# Patient Record
Sex: MALE | Race: ASIAN | Employment: FULL TIME | URBAN - METROPOLITAN AREA
[De-identification: names, ages, dates, MRNs, and addresses within clinical notes are randomized per-mention and may not be internally consistent; named-entity substitution may affect disease eponyms.]

---

## 2017-03-22 ENCOUNTER — OFFICE VISIT (OUTPATIENT)
Dept: INTERNAL MEDICINE | Facility: CLINIC | Age: 32
End: 2017-03-22

## 2017-03-22 VITALS
DIASTOLIC BLOOD PRESSURE: 82 MMHG | SYSTOLIC BLOOD PRESSURE: 134 MMHG | HEART RATE: 54 BPM | BODY MASS INDEX: 21.82 KG/M2 | WEIGHT: 131.1 LBS | OXYGEN SATURATION: 98 % | RESPIRATION RATE: 18 BRPM

## 2017-03-22 DIAGNOSIS — M35.9 CONNECTIVE TISSUE DISORDER (H): Primary | ICD-10-CM

## 2017-03-22 DIAGNOSIS — M35.9 CONNECTIVE TISSUE DISORDER (H): ICD-10-CM

## 2017-03-22 DIAGNOSIS — R74.8 ABNORMAL LIVER ENZYMES: ICD-10-CM

## 2017-03-22 LAB
ALBUMIN SERPL-MCNC: 4.4 G/DL (ref 3.4–5)
ALP SERPL-CCNC: 96 U/L (ref 40–150)
ALT SERPL W P-5'-P-CCNC: 64 U/L (ref 0–70)
ANION GAP SERPL CALCULATED.3IONS-SCNC: 7 MMOL/L (ref 3–14)
AST SERPL W P-5'-P-CCNC: 28 U/L (ref 0–45)
BILIRUB SERPL-MCNC: 0.6 MG/DL (ref 0.2–1.3)
BUN SERPL-MCNC: 17 MG/DL (ref 7–30)
CALCIUM SERPL-MCNC: 9.3 MG/DL (ref 8.5–10.1)
CHLORIDE SERPL-SCNC: 104 MMOL/L (ref 94–109)
CO2 SERPL-SCNC: 30 MMOL/L (ref 20–32)
CREAT SERPL-MCNC: 0.79 MG/DL (ref 0.66–1.25)
GFR SERPL CREATININE-BSD FRML MDRD: NORMAL ML/MIN/1.7M2
GLUCOSE SERPL-MCNC: 82 MG/DL (ref 70–99)
POTASSIUM SERPL-SCNC: 3.8 MMOL/L (ref 3.4–5.3)
PROT SERPL-MCNC: 8.6 G/DL (ref 6.8–8.8)
SODIUM SERPL-SCNC: 141 MMOL/L (ref 133–144)

## 2017-03-22 ASSESSMENT — PAIN SCALES - GENERAL: PAINLEVEL: NO PAIN (0)

## 2017-03-22 NOTE — MR AVS SNAPSHOT
After Visit Summary   3/22/2017    Mable Kent    MRN: [id number]           Patient Information     Date Of Birth          1985        Visit Information        Provider Department      3/22/2017 5:45 PM Iker Rushing MD [facility]        Today's Diagnoses     Connective tissue disorder (H)    -  1    Abnormal liver enzymes          Care Instructions    Primary Care Center Medication Refill Request Information:  * Please contact your pharmacy regarding ANY request for medication refills.  ** [facility] Prescription Fax = [phone]  * Please allow 3 business days for routine medication refills.  * Please allow 5 business days for controlled substance medication refills.     Primary Care Center Test Result notification information:  *You will be notified with in 7-10 days of your appointment day regarding the results of your test.  If you are on MyChart you will be notified as soon as the provider has reviewed the results and signed off on them.    Primary Care Center Phone Number [phone]          Follow-ups after your visit        Your next 10 appointments already scheduled     Mar 22, 2017  6:30 PM CDT   LAB with [facility] ([facility])    [address] [place] [id number] [phone]           Patient must bring picture ID.  Patient should be prepared to give a urine specimen  Please do not eat 10-12 hours before your appointment if you are coming in fasting for labs on lipids, cholesterol, or glucose (sugar).  Pregnant women should follow their Care Team instructions. Water with medications is okay. Do not drink coffee or other fluids.   If you have concerns about taking  your medications, please ask at office or if scheduling via [employer], send a message by clicking on Secure Messaging, Message Your Care Team.              Future tests that were ordered for you today     Open Standing Orders         Priority Remaining Interval Expires Ordered    Comprehensive metabolic panel Routine 4/4  3/22/2018 3/22/2017            Who to contact     Please call your clinic at [phone] to:    Ask questions about your health    Make or cancel appointments    Discuss your medicines    Learn about your test results    Speak to your doctor   If you have compliments or concerns about an experience at your clinic, or if you wish to file a complaint, please contact [facility] Patient Relations at [phone] or email us at [email or web address]         Additional Information About Your Visit        Lesson [employer] Information     [email or web address] gives you secure access to your electronic health record. If you see a primary care provider, you can also send messages to your care team and make appointments. If you have questions, please call your primary care clinic.  If you do not have a primary care provider, please call [phone] and they will assist you.      [email or web address] is an electronic gateway that provides easy, online access to your medical records. With [email or web address], you can request a clinic appointment, read your test results, renew a prescription or communicate with your care team.     To access your existing account, please contact your [facility] or call [phone] for assistance.        Care EveryWhere ID     This is your Care EveryWhere ID. This could be used by other organizations to access your [place] medical records  [id number]        Your Vitals Were     Pulse Respirations Pulse Oximetry BMI (Body Mass Index)          54 18 98% 21.82 kg/m2         Blood Pressure from Last 3 Encounters:   03/22/17 134/82   08/22/16 141/89   05/27/16 144/90    Weight from Last 3 Encounters:   03/22/17 59.5 kg (131 lb 1.6 oz)   05/27/16 59.9 kg (132 lb)   06/18/15 63.5 kg (140 lb)               Primary Care Provider Office Phone # Fax #    Iker Rushing [phone]  [phone]        [facility] [address]  [facility] [address]        Thank you!     Thank you for choosing [facility]  for your care. Our goal is always to provide you with excellent care. Hearing back from our patients is one way we can continue to improve our services. Please take a few minutes to complete the written survey that you may receive in the mail after your visit with us. Thank you!             Your Updated Medication List - Protect others around you: Learn how to safely use, store and throw away your medicines at www.disposemymeds.org.          This list is accurate as of: 3/22/17  5:49 PM.  Always use your most recent med list.                   Brand Name Dispense Instructions for use    cetirizine 10 MG tablet    zyrTEC    60 tablet    Take 1 tablet (10 mg) by mouth daily As needed for itching       finasteride 5 MG tablet    PROSCAR    30 tablet    Take 1 tablet (5 mg) by mouth daily       ibuprofen 800 MG tablet    ADVIL/MOTRIN    90 tablet    Take 1 tablet (800 mg) by mouth every 8 hours as needed for moderate pain       minoxidil 5 % Soln     120 mL    Apply topically twice daily.       Multi-vitamin Tabs tablet      Take 1 tablet by mouth daily

## 2017-03-22 NOTE — NURSING NOTE
Chief Complaint   Patient presents with     Medication Question     Patient has a medication question.      Felicia Zelaya LPN at 5:29 PM on 3/22/2017.

## 2017-03-22 NOTE — PATIENT INSTRUCTIONS
Primary Care Center Medication Refill Request Information:  * Please contact your pharmacy regarding ANY request for medication refills.  ** [facility] Prescription Fax = [phone]  * Please allow 3 business days for routine medication refills.  * Please allow 5 business days for controlled substance medication refills.     Primary [facility] Test Result notification information:  *You will be notified with in 7-10 days of your appointment day regarding the results of your test.  If you are on MyChart you will be notified as soon as the provider has reviewed the results and signed off on them.    Primary Care Center Phone Number [phone]

## 2017-03-22 NOTE — PROGRESS NOTES
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  It is a traditional Chinese medicine.  He has recently purchased it.  He has researched it online.  He has found that it can be associated with some problems with taking it with underlying liver or kidney disease.  He has had the previous elevated liver function studies.  He underwent extensive evaluation including liver biopsy for this, which was negative.  He is wanting to have this checked before he considers taking this.  Otherwise, he has been doing well, no problems on the finasteride.  Urinary symptoms have improved.      OBJECTIVE:  On examination, he does have male pattern baldness, particularly on the temporal areas.      ASSESSMENT:   1.  Male pattern baldness.   2.  Lower urinary tract symptoms, improved.      PLAN:  He will continue the finasteride.  We will check his CMP today.  Put a standing order for this if he wants to have it checked in the future.  If he develops any symptoms or problems, will have him follow up p.r.n. regarding this.     Iker Rushing

## 2017-07-27 ENCOUNTER — OFFICE VISIT (OUTPATIENT)
Dept: FAMILY MEDICINE | Facility: CLINIC | Age: 32
End: 2017-07-27

## 2017-07-27 VITALS
HEART RATE: 60 BPM | OXYGEN SATURATION: 98 % | SYSTOLIC BLOOD PRESSURE: 128 MMHG | RESPIRATION RATE: 20 BRPM | TEMPERATURE: 97.8 F | DIASTOLIC BLOOD PRESSURE: 80 MMHG | BODY MASS INDEX: 21.86 KG/M2 | WEIGHT: 136 LBS | HEIGHT: 66 IN

## 2017-07-27 DIAGNOSIS — M79.601 PAIN OF RIGHT UPPER EXTREMITY: Primary | ICD-10-CM

## 2017-07-27 NOTE — MR AVS SNAPSHOT
After Visit Summary   7/27/2017    Mable Kent    MRN: [id number]           Patient Information     Date Of Birth          1985        Visit Information        Provider Department      7/27/2017 9:40 AM Chiara Rodriguez MD [facility]        Today's Diagnoses     Pain of right upper extremity    -  1      Care Instructions    Here is the plan from today's visit    1. Pain of right upper extremity - OK to take tylenol or ibuprofen if it comes back and return if keeps being a problem.     Please call or return to clinic if your symptoms don't go away.    Follow up plan      Thank you for coming to [facility] today.  Lab Testing:  **If you had lab testing today and your results are reassuring or normal they will be mailed to you or sent through [employer] within 7 days.   **If the lab tests need quick action we will call you with the results.  The phone number we will call with results is # [phone] (home) . If this is not the best number please call our clinic and change the number.  Medication Refills:  If you need any refills please call your pharmacy and they will contact us.   If you need to  your refill at a new pharmacy, please contact the new pharmacy directly. The new pharmacy will help you get your medications transferred faster.   Scheduling:  If you have any concerns about today's visit or wish to schedule another appointment please call our office during normal business hours [phone] (8-5:00 M-F)  If a referral was made to a [facility] and you don't get a call from central scheduling please call [phone].  If a Mammogram was ordered for you at [facility] call [phone] to schedule or change your appointment.  If you had an XRay/CT/Ultrasound/MRI ordered the number is [phone] to schedule or change your radiology appointment.   Medical Concerns:  If you have urgent medical concerns please call [phone] at  "any time of the day.          Follow-ups after your visit        Who to contact     Please call your clinic at [phone] to:    Ask questions about your health    Make or cancel appointments    Discuss your medicines    Learn about your test results    Speak to your doctor   If you have compliments or concerns about an experience at your clinic, or if you wish to file a complaint, please contact [facility] Patient Relations at [phone] or email us at [email or web address]         Additional Information About Your Visit        [employer] Information     [employer] gives you secure access to your electronic health record. If you see a primary care provider, you can also send messages to your care team and make appointments. If you have questions, please call your primary care clinic.  If you do not have a primary care provider, please call [phone] and they will assist you.      [employer] is an electronic gateway that provides easy, online access to your medical records. With [employer], you can request a clinic appointment, read your test results, renew a prescription or communicate with your care team.     To access your existing account, please contact your [facility] or call [phone] for assistance.        Care EveryWhere ID     This is your Care EveryWhere ID. This could be used by other organizations to access your [place] medical records  [id number]        Your Vitals Were     Pulse Temperature Respirations Height Pulse Oximetry BMI (Body Mass Index)    60 97.8  F (36.6  C) (Oral) 20 5' 6.25\" (168.3 cm) 98% 21.79 kg/m2       Blood Pressure from Last 3 Encounters:   07/27/17 128/80   03/22/17 134/82   08/22/16 141/89    Weight from Last 3 Encounters:   07/27/17 136 lb (61.7 kg)   03/22/17 131 lb 1.6 oz (59.5 kg)   05/27/16 132 lb (59.9 kg)              Today, you had the following     No orders found for display       Primary Care " Provider Office Phone # Fax #    Iker Clinton Rushing [phone]        [facility] [address] [facility] [address]  [facility] [address]        Equal Access to Services     CEDRICK SIMON : Hadkaylynn oxana tatum mandyo Sojessicaali, waaxda luqadaha, qaybta kaalmada adekanda, evelyn kitchen laPoncejose cruz nayak. So [facility] [phone].    ATENCIÓN: Si habla español, tiene a sol disposición servicios gratuitos de asistencia lingüística. Llame al [phone].    We comply with applicable federal civil rights laws and [place] laws. We do not discriminate on the basis of race, color, national origin, age, disability sex, sexual orientation or gender identity.            Thank you!     Thank you for choosing [facility]  for your care. Our goal is always to provide you with excellent care. Hearing back from our patients is one way we can continue to improve our services. Please take a few minutes to complete the written survey that you may receive in the mail after your visit with us. Thank you!             Your Updated Medication List - Protect others around you: Learn how to safely use, store and throw away your medicines at www.disposemymeds.org.          This list is accurate as of: 7/27/17 11:03 AM.  Always use your most recent med list.                   Brand Name Dispense Instructions for use Diagnosis    cetirizine 10 MG tablet    zyrTEC    60 tablet    Take 1 tablet (10 mg) by mouth daily As needed for itching    Itching, Abnormal liver enzymes, Connective tissue disorder (H)       finasteride 5 MG tablet    PROSCAR    30 tablet    Take 1 tablet (5 mg) by mouth daily    Lower urinary tract symptoms (LUTS)       ibuprofen 800 MG tablet    ADVIL/MOTRIN    90 tablet    Take 1 tablet (800 mg) by mouth every 8 hours as needed for moderate pain    Connective tissue disorder (H)       minoxidil 5 % Soln     120 mL    Apply topically twice daily.    Alopecia, unspecified       Multi-vitamin  Tabs tablet      Take 1 tablet by mouth daily

## 2017-07-27 NOTE — PATIENT INSTRUCTIONS
Here is the plan from today's visit    1. Pain of right upper extremity - OK to take tylenol or ibuprofen if it comes back and return if keeps being a problem.     Please call or return to clinic if your symptoms don't go away.    Follow up plan      Thank you for coming to [facility] today.  Lab Testing:  **If you had lab testing today and your results are reassuring or normal they will be mailed to you or sent through [facility] within 7 days.   **If the lab tests need quick action we will call you with the results.  The phone number we will call with results is # [phone] (home) . If this is not the best number please call our clinic and change the number.  Medication Refills:  If you need any refills please call your pharmacy and they will contact us.   If you need to  your refill at a new pharmacy, please contact the new pharmacy directly. The new pharmacy will help you get your medications transferred faster.   Scheduling:  If you have any concerns about today's visit or wish to schedule another appointment please call our office during normal business hours [phone] (8-5:00 M-F)  If a referral was made to a [facility] and you don't get a call from central scheduling please call [phone].  If a Mammogram was ordered for you at [facility] call [phone] to schedule or change your appointment.  If you had an XRay/CT/Ultrasound/MRI ordered the number is [phone] to schedule or change your radiology appointment.   Medical Concerns:  If you have urgent medical concerns please call [phone] at any time of the day.

## 2017-07-27 NOTE — PROGRESS NOTES
"      HPI:       Mable Kent is a 32 year old who presents for the following  Patient presents with:  Musculoskeletal Problem: unable to sleep, right deltoid pulsing, beating, increasing    Previously was getting care at the [facility] arm has had \"pulsating\" pain in the triceps area musculature on and off since yesterday. No pattern of provocative factors. No obvious mechanism of injury. Insidious onset. Hasn't tried any therapies. No associated weakness, paresthesias. Symptoms are limited to the triceps area. Pulsating. No fever, chills, weight loss or other systemic symptoms. No recent illness. Notices that he has had most symptoms at rest. Can reproduce the throbbing sensation by palpation of the area. Some stress yesterday (interview).       Concern: Arm Irritation   Description of the problem :Pulsating arm, beating,deltoid     When did it start?: 1 day    Intensity: moderate    Progression of Symptoms:  worsening and intermittent    Therapies Tried nothing    What has worked?  n/a            Adherence and Exercise  Medication side effects: no  How often is a medication missed? Never  Are you able to follow the treatment plan? Yes  Exercise:running 2-3 days/week for an average of 15-30 minutes          Problem, Medication and Allergy Lists were reviewed and are current.  Patient is a new patient to this clinic and so  I reviewed/updated the Past Medical History, the Family History and the Social History.          Review of Systems:   Review of Systems          Physical Exam:     Patient Vitals for the past 24 hrs:   BP Temp Temp src Pulse Resp SpO2 Height Weight   07/27/17 0959 128/80 97.8  F (36.6  C) Oral 60 20 98 % 5' 6.25\" (168.3 cm) 136 lb (61.7 kg)     Body mass index is 21.79 kg/(m^2).  Vitals were reviewed and were normal     Physical Exam  Right arm with normal ROM, non tender on palpation of the upper arm (triceps, biceps and deltoid) - nl strength. Nl exam of hand with nl function " of ulnar, radial and median nerves. Nl sensation  Psych- nl affect, nl judgement and insight, good eye contact, well dressed.     Results:       Assessment and Plan     Mable was seen today for musculoskeletal problem.    Diagnoses and all orders for this visit:    Pain of right upper extremity - nl exam and symptoms resolved. Reviewed that if symptoms return or get worse to come back and we can do some imaging.        There are no discontinued medications.  Options for treatment and follow-up care were reviewed with the patient. Mable Kent  engaged in the decision making process and verbalized understanding of the options discussed and agreed with the final plan.    Chiara Rodriguez MD    The medical student acted as scribe and the encounter documented above was completely performed by myself and the documentation reflects the work I have performed today. Chiara Rodriguez MD

## 2017-10-05 DIAGNOSIS — R39.9 LOWER URINARY TRACT SYMPTOMS (LUTS): ICD-10-CM

## 2017-10-05 DIAGNOSIS — M35.9 CONNECTIVE TISSUE DISORDER (H): ICD-10-CM

## 2017-10-05 RX ORDER — FINASTERIDE 5 MG/1
5 TABLET, FILM COATED ORAL DAILY
Qty: 90 TABLET | Refills: 0 | Status: SHIPPED | OUTPATIENT
Start: 2017-10-05

## 2017-10-05 RX ORDER — IBUPROFEN 800 MG/1
800 TABLET, FILM COATED ORAL EVERY 8 HOURS PRN
Qty: 90 TABLET | Refills: 0 | Status: SHIPPED | OUTPATIENT
Start: 2017-10-05

## 2017-10-05 NOTE — TELEPHONE ENCOUNTER
proscar   Last Written Prescription Date:  8/22/16  Last Fill Quantity: 30,   # refills: 3  advil      Last Written Prescription Date:  4/5/16  Last Fill Quantity: 90,   # refills: 2  Last Office Visit : 3/22/17  Future Office visit:  No future appt  Advil routed to clinic RN dose not on protocol    BP Readings from Last 3 Encounters:   07/27/17 128/80   03/22/17 134/82   08/22/16 141/89

## 2020-03-02 ENCOUNTER — HEALTH MAINTENANCE LETTER (OUTPATIENT)
Age: 35
End: 2020-03-02

## 2020-12-14 ENCOUNTER — HEALTH MAINTENANCE LETTER (OUTPATIENT)
Age: 35
End: 2020-12-14

## 2021-04-18 ENCOUNTER — HEALTH MAINTENANCE LETTER (OUTPATIENT)
Age: 36
End: 2021-04-18

## 2021-10-02 ENCOUNTER — HEALTH MAINTENANCE LETTER (OUTPATIENT)
Age: 36
End: 2021-10-02

## 2022-05-14 ENCOUNTER — HEALTH MAINTENANCE LETTER (OUTPATIENT)
Age: 37
End: 2022-05-14

## 2022-09-03 ENCOUNTER — HEALTH MAINTENANCE LETTER (OUTPATIENT)
Age: 37
End: 2022-09-03

## 2023-06-03 ENCOUNTER — HEALTH MAINTENANCE LETTER (OUTPATIENT)
Age: 38
End: 2023-06-03